# Patient Record
Sex: FEMALE | Race: WHITE | NOT HISPANIC OR LATINO | URBAN - METROPOLITAN AREA
[De-identification: names, ages, dates, MRNs, and addresses within clinical notes are randomized per-mention and may not be internally consistent; named-entity substitution may affect disease eponyms.]

---

## 2022-06-04 ENCOUNTER — TELEPHONE ENCOUNTER (OUTPATIENT)
Dept: URBAN - METROPOLITAN AREA CLINIC 68 | Facility: CLINIC | Age: 82
End: 2022-06-04

## 2022-06-04 RX ORDER — LATANOPROST/PF 0.005 %
LATANOPROST( 0.005% OPHTHALMIC  DAILY ) INACTIVE -HX ENTRY DROPS OPHTHALMIC (EYE) DAILY
OUTPATIENT
Start: 2019-01-28

## 2022-06-04 RX ORDER — MONTELUKAST SODIUM 10 MG/1
SINGULAIR(    1 TABLET EVERY EVENING ) INACTIVE -HX ENTRY TABLET, FILM COATED ORAL
OUTPATIENT
Start: 2008-11-05

## 2022-06-04 RX ORDER — CLOPIDOGREL BISULFATE 75 MG
PLAVIX(    1 TABLET DAILY ) INACTIVE -HX ENTRY TABLET ORAL
OUTPATIENT
Start: 2008-11-05

## 2022-06-04 RX ORDER — LEVOTHYROXINE SODIUM 50 UG/1
TABLET ORAL AS DIRECTED
OUTPATIENT
Start: 2011-08-24 | End: 2015-07-02

## 2022-06-04 RX ORDER — FAMOTIDINE 10 MG/1
FAMOTIDINE(    1 TABLET DAILY AS NEEDED ) INACTIVE -HX ENTRY TABLET ORAL
OUTPATIENT
Start: 2008-11-05

## 2022-06-04 RX ORDER — DEXLANSOPRAZOLE 60 MG/1
DEXILANT( 60MG ORAL  ONE CAPSULE DAILY ) INACTIVE -HX ENTRY CAPSULE, DELAYED RELEASE ORAL
OUTPATIENT
Start: 2011-08-24

## 2022-06-04 RX ORDER — METOPROLOL SUCCINATE 25 MG/1
TOPROL XL(    1/2 BID ) INACTIVE -HX ENTRY TABLET, EXTENDED RELEASE ORAL
OUTPATIENT
Start: 2010-01-22

## 2022-06-04 RX ORDER — POLYETHYLENE GLYOCOL 3350, SODIUM CHLORIDE, SODIUM BICARBONATE AND POTASSIUM CHLORIDE 420; 11.2; 5.72; 1.48 G/4L; G/4L; G/4L; G/4L
TRILYTE(    1 TIME ONLY ) INACTIVE -HX ENTRY POWDER, FOR SOLUTION NASOGASTRIC; ORAL
OUTPATIENT
Start: 2010-01-22

## 2022-06-04 RX ORDER — LINAGLIPTIN 5 MG/1
TRADJENTA( 5MG ORAL  DAILY ) INACTIVE -HX ENTRY TABLET, FILM COATED ORAL DAILY
OUTPATIENT
Start: 2019-01-28

## 2022-06-04 RX ORDER — ESOMEPRAZOLE MAGNESIUM 40 MG
NEXIUM(    1 TABLET 30 MINUTES BEFORE BREAKFAST AND 30 MINUTES BEFORE EVENING MEAL. ) INACTIVE -HX ENTRY CAPSULE,DELAYED RELEASE (ENTERIC COATED) ORAL
OUTPATIENT
Start: 2006-04-12

## 2022-06-04 RX ORDER — FEBUXOSTAT 40 MG/1
ULORIC( 40MG ORAL  DAILY ) INACTIVE -HX ENTRY TABLET ORAL DAILY
OUTPATIENT
Start: 2019-01-28

## 2022-06-04 RX ORDER — HYDROXYUREA 500 MG/1
HYDROXYUREA(    2 CAPS DAILY ) INACTIVE -HX ENTRY CAPSULE ORAL
OUTPATIENT
Start: 2010-01-22

## 2022-06-04 RX ORDER — POTASSIUM CHLORIDE 600 MG/1
KLOR-CON(    QD ) INACTIVE -HX ENTRY TABLET, FILM COATED, EXTENDED RELEASE ORAL QD
OUTPATIENT
Start: 2005-01-03

## 2022-06-04 RX ORDER — FUROSEMIDE 20 MG/1
LASIX(    1 TABLET DAILY ) INACTIVE -HX ENTRY TABLET ORAL
OUTPATIENT
Start: 2005-01-03

## 2022-06-04 RX ORDER — RABEPRAZOLE SODIUM 20 MG/1
ACIPHEX(    1/2 HOUR BEFORE BREAKFAST ) INACTIVE -HX ENTRY TABLET, DELAYED RELEASE ORAL
OUTPATIENT
Start: 2006-04-06

## 2022-06-04 RX ORDER — ESOMEPRAZOLE MAGNESIUM 40 MG
NEXIUM(    1 TABLET 30 MINUTES BEFORE BREAKFAST AND 30 MINUTES BEFORE EVENING MEAL. ) INACTIVE -HX ENTRY CAPSULE,DELAYED RELEASE (ENTERIC COATED) ORAL
OUTPATIENT
Start: 2006-04-24

## 2022-06-04 RX ORDER — PREDNISONE 5 MG/1
PREDNISONE( 5MG ORAL 7 MG AS DIRECTED ) INACTIVE -HX ENTRY TABLET ORAL AS DIRECTED
OUTPATIENT
Start: 2011-05-25

## 2022-06-04 RX ORDER — MELOXICAM 7.5 MG
MOBIC(    ONE PO BID ) INACTIVE -HX ENTRY TABLET ORAL
OUTPATIENT
Start: 2005-01-03

## 2022-06-04 RX ORDER — POLYETHYLENE GLYCOL 3350, SODIUM SULFATE, SODIUM CHLORIDE, POTASSIUM CHLORIDE, ASCORBIC ACID, SODIUM ASCORBATE 7.5-2.691G
KIT ORAL
Qty: 1 | Refills: 0 | OUTPATIENT
Start: 2014-12-09 | End: 2015-07-02

## 2022-06-04 RX ORDER — METOPROLOL SUCCINATE 25 MG/1
TOPROL XL(    2 TABS PO DAILY ) INACTIVE -HX ENTRY TABLET, EXTENDED RELEASE ORAL
OUTPATIENT
Start: 2010-08-16

## 2022-06-04 RX ORDER — ESOMEPRAZOLE MAGNESIUM 40 MG
NEXIUM(    1 TAB 30 MINUTES BEFORE BREAKFAST DAILY ) INACTIVE -HX ENTRY CAPSULE,DELAYED RELEASE (ENTERIC COATED) ORAL
OUTPATIENT
Start: 2006-04-20

## 2022-06-04 RX ORDER — MAGNESIUM 250 MG
TABLET ORAL AS DIRECTED
OUTPATIENT
Start: 2011-07-19 | End: 2013-06-18

## 2022-06-04 RX ORDER — INSULIN ASPART 100 [IU]/ML
NOVOLOG FLEXPEN( 100UNIT/ML SUBCUTANEOUS 8 UNITS WITH MEALS ) INACTIVE -HX ENTRY INJECTION, SOLUTION INTRAVENOUS; SUBCUTANEOUS
OUTPATIENT
Start: 2019-01-28

## 2022-06-04 RX ORDER — FLUTICASONE PROPIONATE AND SALMETEROL 50; 250 UG/1; UG/1
POWDER RESPIRATORY (INHALATION) TWICE DAILY
OUTPATIENT
Start: 2010-01-22 | End: 2012-09-04

## 2022-06-04 RX ORDER — MILK THISTLE 150 MG
CAPSULE ORAL DAILY
Qty: 0 | Refills: 0 | OUTPATIENT
Start: 2011-12-09 | End: 2012-01-08

## 2022-06-05 ENCOUNTER — TELEPHONE ENCOUNTER (OUTPATIENT)
Dept: URBAN - METROPOLITAN AREA CLINIC 68 | Facility: CLINIC | Age: 82
End: 2022-06-05

## 2022-06-05 RX ORDER — FERROUS SULFATE 325(65) MG
VITAMIN D3( 2000UNIT ORAL  DAILY ) ACTIVE -HX ENTRY TABLET ORAL DAILY
Status: ACTIVE | COMMUNITY
Start: 2019-01-28

## 2022-06-05 RX ORDER — PREDNISONE 1 MG/1
PREDNISONE( 1MG ORAL 9  DAILY ) ACTIVE -HX ENTRY TABLET ORAL DAILY
Status: ACTIVE | COMMUNITY
Start: 2019-01-28

## 2022-06-05 RX ORDER — INSULIN DEGLUDEC INJECTION 100 U/ML
TRESIBA FLEXTOUCH( 100UNIT/ML SUBCUTANEOUS 40 UNITS DAILY ) ACTIVE -HX ENTRY INJECTION, SOLUTION SUBCUTANEOUS DAILY
Status: ACTIVE | COMMUNITY
Start: 2019-01-28

## 2022-06-05 RX ORDER — MAGNESIUM OXIDE 400 MG/1
MAGNESIUM OXIDE( 400MG ORAL  EVERY OTHER DAY ) ACTIVE -HX ENTRY TABLET ORAL EVERY OTHER DAY
Status: ACTIVE | COMMUNITY
Start: 2019-01-28

## 2022-06-05 RX ORDER — COLCHICINE 0.6 MG/1
COLCRYS( 0.6MG ORAL  PRN ) ACTIVE -HX ENTRY TABLET, FILM COATED ORAL PRN
Status: ACTIVE | COMMUNITY
Start: 2019-01-28

## 2022-06-05 RX ORDER — CYCLOSPORINE 0.5 MG/ML
RESTASIS( 0.05% OPHTHALMIC  DAILY ) ACTIVE -HX ENTRY EMULSION OPHTHALMIC DAILY
Status: ACTIVE | COMMUNITY
Start: 2019-01-28

## 2022-06-05 RX ORDER — ALLOPURINOL 300 MG/1
ALLOPURINOL( 300MG ORAL 1 DAILY ) ACTIVE -HX ENTRY TABLET ORAL DAILY
Status: ACTIVE | COMMUNITY
Start: 2019-01-28

## 2022-06-05 RX ORDER — LEVOTHYROXINE SODIUM 88 UG/1
SYNTHROID( 88MCG ORAL  DAILY ) ACTIVE -HX ENTRY TABLET ORAL DAILY
Status: ACTIVE | COMMUNITY
Start: 2015-07-02

## 2022-06-05 RX ORDER — BUMETANIDE 2 MG/1
BUMEX( 2MG ORAL  TWO TIMES DAILY ) ACTIVE -HX ENTRY TABLET ORAL
Status: ACTIVE | COMMUNITY
Start: 2010-08-16

## 2022-06-05 RX ORDER — GINKGO BILOBA LEAF EXTRACT 120 MG
MILK THISTLE( 250MG ORAL  DAILY ) ACTIVE -HX ENTRY CAPSULE ORAL DAILY
Status: ACTIVE | COMMUNITY
Start: 2019-01-28

## 2022-06-05 RX ORDER — ROSUVASTATIN CALCIUM 10 MG
CRESTOR( 10MG ORAL 1 DAILY ) ACTIVE -HX ENTRY TABLET ORAL DAILY
Status: ACTIVE | COMMUNITY
Start: 2019-01-28

## 2022-06-05 RX ORDER — BRIMONIDINE TARTRATE 1 MG/ML
ALPHAGAN P( 0.1% OPHTHALMIC  DAILY ) ACTIVE -HX ENTRY SOLUTION/ DROPS OPHTHALMIC DAILY
Status: ACTIVE | COMMUNITY
Start: 2019-01-28

## 2022-06-05 RX ORDER — TRAMADOL HYDROCHLORIDE 50 MG/1
TRAMADOL HCL( 50MG ORAL  THREE TIMES DAILY AS NEEDED ) ACTIVE -HX ENTRY TABLET ORAL
Status: ACTIVE | COMMUNITY
Start: 2019-01-28

## 2022-06-05 RX ORDER — HYDROXYUREA 500 MG/1
CAPSULE ORAL DAILY
Status: ACTIVE | COMMUNITY
Start: 2011-06-29

## 2022-06-05 RX ORDER — EXENATIDE 2 MG/.65ML
BYDUREON( 2MG SUBCUTANEOUS 1 PER WEEK ) ACTIVE -HX ENTRY INJECTION, SUSPENSION, EXTENDED RELEASE SUBCUTANEOUS
Status: ACTIVE | COMMUNITY
Start: 2019-01-28

## 2022-06-05 RX ORDER — LEVOTHYROXINE SODIUM 88 UG/1
SYNTHROID( 88MCG ORAL 1 DAILY ) ACTIVE -HX ENTRY TABLET ORAL DAILY
Status: ACTIVE | COMMUNITY
Start: 2019-01-28

## 2022-06-05 RX ORDER — ZOLEDRONIC ACID 5 MG/100ML
RECLAST( 5MG/100ML INTRAVENOUS  ANUALLY ) ACTIVE -HX ENTRY INJECTION, SOLUTION INTRAVENOUS
Status: ACTIVE | COMMUNITY
Start: 2019-01-28

## 2022-06-05 RX ORDER — NITROGLYCERIN 0.3 MG/1
NITROSTAT( 0.3MG SUBLINGUAL  AS NEEDED ) ACTIVE -HX ENTRY TABLET SUBLINGUAL AS NEEDED
Status: ACTIVE | COMMUNITY
Start: 2019-01-28

## 2022-06-05 RX ORDER — OMEPRAZOLE 20 MG/1
CAPSULE, DELAYED RELEASE ORAL
Qty: 180 | Refills: 180 | Status: ACTIVE | COMMUNITY
Start: 2020-12-28

## 2022-06-25 ENCOUNTER — TELEPHONE ENCOUNTER (OUTPATIENT)
Age: 82
End: 2022-06-25

## 2022-06-25 RX ORDER — MAGNESIUM 250 MG
TABLET ORAL AS DIRECTED
OUTPATIENT
Start: 2011-07-19 | End: 2013-06-18

## 2022-06-25 RX ORDER — SITAGLIPTIN PHOSPHATE 100 MG
JANUVIA(    QD ) INACTIVE -HX ENTRY TABLET ORAL QD
OUTPATIENT
Start: 2010-01-22

## 2022-06-25 RX ORDER — CALCIUM CARBONATE/VITAMIN D3 600 MG-10
CALCIUM + D(    BID ) INACTIVE -HX ENTRY TABLET ORAL BID
OUTPATIENT
Start: 2011-05-25

## 2022-06-25 RX ORDER — BIMATOPROST 0.1 MG/ML
SOLUTION/ DROPS OPHTHALMIC
OUTPATIENT
Start: 2011-05-25 | End: 2012-09-04

## 2022-06-25 RX ORDER — ASPIRIN 81 MG/1
ASPIRIN(    1 TABLET DAILY ) INACTIVE -HX ENTRY TABLET, COATED ORAL
OUTPATIENT
Start: 2008-11-05

## 2022-06-25 RX ORDER — MONTELUKAST SODIUM 10 MG/1
SINGULAIR(    1 TABLET EVERY EVENING ) INACTIVE -HX ENTRY TABLET, FILM COATED ORAL
OUTPATIENT
Start: 2008-11-05

## 2022-06-25 RX ORDER — DEXTROAMPHETAMINE SULFATE 10 MG/1
DARVOCET-N 100(    PRN ) INACTIVE -HX ENTRY CAPSULE, EXTENDED RELEASE ORAL PRN
OUTPATIENT
Start: 2010-01-22

## 2022-06-25 RX ORDER — LATANOPROST/PF 0.005 %
LATANOPROST( 0.005% OPHTHALMIC  DAILY ) INACTIVE -HX ENTRY DROPS OPHTHALMIC (EYE) DAILY
OUTPATIENT
Start: 2019-01-28

## 2022-06-25 RX ORDER — HYDROXYUREA 500 MG/1
HYDROXYUREA(    2 CAPS DAILY ) INACTIVE -HX ENTRY CAPSULE ORAL
OUTPATIENT
Start: 2010-01-22

## 2022-06-25 RX ORDER — FEBUXOSTAT 40 MG/1
ULORIC( 40MG ORAL  DAILY ) INACTIVE -HX ENTRY TABLET ORAL DAILY
OUTPATIENT
Start: 2019-01-28

## 2022-06-25 RX ORDER — INSULIN ASPART 100 [IU]/ML
NOVOLOG FLEXPEN( 100UNIT/ML SUBCUTANEOUS 8 UNITS WITH MEALS ) INACTIVE -HX ENTRY INJECTION, SOLUTION INTRAVENOUS; SUBCUTANEOUS
OUTPATIENT
Start: 2019-01-28

## 2022-06-25 RX ORDER — INSULIN GLARGINE 100 [IU]/ML
LANTUS FOR OPTICLIK( 100UNIT/ML SUBCUTANEOUS 71 UNITS AS DIRECTED ) INACTIVE -HX ENTRY INJECTION, SOLUTION SUBCUTANEOUS AS DIRECTED
OUTPATIENT
Start: 2013-06-18

## 2022-06-25 RX ORDER — DEXLANSOPRAZOLE 30 MG
KAPIDEX(    1QD ) INACTIVE -HX ENTRY CAPSULE, DELAYED RELEASE, BIPHASIC ORAL
OUTPATIENT
Start: 2010-01-22

## 2022-06-25 RX ORDER — PREDNISONE 5 MG/1
PREDNISONE( 5MG ORAL 7 MG AS DIRECTED ) INACTIVE -HX ENTRY TABLET ORAL AS DIRECTED
OUTPATIENT
Start: 2011-05-25

## 2022-06-25 RX ORDER — PREDNISONE 10 MG/1
PREDNISONE(    6 MG ) INACTIVE -HX ENTRY TABLET ORAL
OUTPATIENT
Start: 2010-01-22

## 2022-06-25 RX ORDER — LINAGLIPTIN 5 MG/1
TRADJENTA( 5MG ORAL  DAILY ) INACTIVE -HX ENTRY TABLET, FILM COATED ORAL DAILY
OUTPATIENT
Start: 2019-01-28

## 2022-06-25 RX ORDER — POTASSIUM CHLORIDE 600 MG/1
KLOR-CON(    QD ) INACTIVE -HX ENTRY TABLET, FILM COATED, EXTENDED RELEASE ORAL QD
OUTPATIENT
Start: 2005-01-03

## 2022-06-25 RX ORDER — PREDNISONE 10 MG/1
PREDNISONE(    1 TABLET DAILY ) INACTIVE -HX ENTRY TABLET ORAL
OUTPATIENT
Start: 2010-08-16

## 2022-06-25 RX ORDER — FUROSEMIDE 20 MG/1
LASIX(    1 TABLET DAILY ) INACTIVE -HX ENTRY TABLET ORAL
OUTPATIENT
Start: 2005-01-03

## 2022-06-25 RX ORDER — INSULIN GLARGINE 100 [IU]/ML
LANTUS FOR OPTICLIK( 100UNIT/ML SUBCUTANEOUS 68 UNITS DAILY ) INACTIVE -HX ENTRY INJECTION, SOLUTION SUBCUTANEOUS DAILY
OUTPATIENT
Start: 2019-01-28

## 2022-06-25 RX ORDER — MILK THISTLE 150 MG
CAPSULE ORAL DAILY
Qty: 0 | Refills: 0 | OUTPATIENT
Start: 2011-12-09 | End: 2012-01-08

## 2022-06-25 RX ORDER — ALBUTEROL SULFATE 108 UG/1
PROVENTIL(     ) INACTIVE -HX ENTRY AEROSOL, METERED RESPIRATORY (INHALATION)
OUTPATIENT
Start: 2008-11-05

## 2022-06-25 RX ORDER — DEXLANSOPRAZOLE 60 MG/1
DEXILANT( 60MG ORAL  ONE CAPSULE DAILY ) INACTIVE -HX ENTRY CAPSULE, DELAYED RELEASE ORAL
OUTPATIENT
Start: 2011-08-24

## 2022-06-25 RX ORDER — POLYETHYLENE GLYCOL 3350, SODIUM SULFATE, SODIUM CHLORIDE, POTASSIUM CHLORIDE, ASCORBIC ACID, SODIUM ASCORBATE 7.5-2.691G
KIT ORAL
Qty: 1 | Refills: 0 | OUTPATIENT
Start: 2014-12-09 | End: 2015-07-02

## 2022-06-25 RX ORDER — METOPROLOL SUCCINATE 25 MG/1
TOPROL XL(    1/2 BID ) INACTIVE -HX ENTRY TABLET, EXTENDED RELEASE ORAL
OUTPATIENT
Start: 2010-01-22

## 2022-06-25 RX ORDER — LIRAGLUTIDE 6 MG/ML
VICTOZA( 18MG/3ML SUBCUTANEOUS  AS DIRECTED ) INACTIVE -HX ENTRY INJECTION SUBCUTANEOUS AS DIRECTED
OUTPATIENT
Start: 2012-09-04

## 2022-06-25 RX ORDER — RABEPRAZOLE SODIUM 20 MG/1
ACIPHEX(    1/2 HOUR BEFORE BREAKFAST ) INACTIVE -HX ENTRY TABLET, DELAYED RELEASE ORAL
OUTPATIENT
Start: 2006-04-06

## 2022-06-25 RX ORDER — FAMOTIDINE 10 MG/1
FAMOTIDINE(    1 TABLET DAILY AS NEEDED ) INACTIVE -HX ENTRY TABLET, FILM COATED ORAL
OUTPATIENT
Start: 2008-11-05

## 2022-06-25 RX ORDER — ERGOCALCIFEROL 1.25 MG/1
CAPSULE ORAL
OUTPATIENT
Start: 2011-05-25 | End: 2019-01-28

## 2022-06-25 RX ORDER — CLOPIDOGREL BISULFATE 75 MG
PLAVIX(    1 TABLET DAILY ) INACTIVE -HX ENTRY TABLET ORAL
OUTPATIENT
Start: 2008-11-05

## 2022-06-25 RX ORDER — ESOMEPRAZOLE MAGNESIUM 40 MG
NEXIUM(    1 TAB 30 MINUTES BEFORE BREAKFAST DAILY ) INACTIVE -HX ENTRY CAPSULE,DELAYED RELEASE (ENTERIC COATED) ORAL
OUTPATIENT
Start: 2006-04-20

## 2022-06-25 RX ORDER — LEVOTHYROXINE SODIUM 50 UG/1
TABLET ORAL AS DIRECTED
OUTPATIENT
Start: 2011-08-24 | End: 2015-07-02

## 2022-06-25 RX ORDER — DILTIAZEM HCL 120 MG
CARDIZEM(    QD ) INACTIVE -HX ENTRY CAPSULE, EXT RELEASE 24 HR ORAL QD
OUTPATIENT
Start: 2008-11-05

## 2022-06-25 RX ORDER — ESOMEPRAZOLE MAGNESIUM 40 MG
NEXIUM(    1 TABLET 30 MINUTES BEFORE BREAKFAST AND 30 MINUTES BEFORE EVENING MEAL. ) INACTIVE -HX ENTRY CAPSULE,DELAYED RELEASE (ENTERIC COATED) ORAL
OUTPATIENT
Start: 2006-04-24

## 2022-06-25 RX ORDER — METOPROLOL SUCCINATE 25 MG/1
TOPROL XL(    2 TABS PO DAILY ) INACTIVE -HX ENTRY TABLET, EXTENDED RELEASE ORAL
OUTPATIENT
Start: 2010-08-16

## 2022-06-25 RX ORDER — ESOMEPRAZOLE MAGNESIUM 40 MG
NEXIUM(    1 TABLET 30 MINUTES BEFORE BREAKFAST AND 30 MINUTES BEFORE EVENING MEAL. ) INACTIVE -HX ENTRY CAPSULE,DELAYED RELEASE (ENTERIC COATED) ORAL
OUTPATIENT
Start: 2006-04-12

## 2022-06-26 ENCOUNTER — TELEPHONE ENCOUNTER (OUTPATIENT)
Age: 82
End: 2022-06-26

## 2022-06-26 RX ORDER — NITROGLYCERIN 0.3 MG/1
NITROSTAT( 0.3MG SUBLINGUAL  AS NEEDED ) ACTIVE -HX ENTRY TABLET SUBLINGUAL AS NEEDED
Status: ACTIVE | COMMUNITY
Start: 2019-01-28

## 2022-06-26 RX ORDER — ZOLEDRONIC ACID 5 MG/100ML
RECLAST( 5MG/100ML INTRAVENOUS  ANUALLY ) ACTIVE -HX ENTRY INJECTION, SOLUTION INTRAVENOUS
Status: ACTIVE | COMMUNITY
Start: 2019-01-28

## 2022-06-26 RX ORDER — COLCHICINE 0.6 MG/1
COLCRYS( 0.6MG ORAL  PRN ) ACTIVE -HX ENTRY TABLET, FILM COATED ORAL PRN
Status: ACTIVE | COMMUNITY
Start: 2019-01-28

## 2022-06-26 RX ORDER — BRIMONIDINE TARTRATE 1 MG/ML
ALPHAGAN P( 0.1% OPHTHALMIC  DAILY ) ACTIVE -HX ENTRY SOLUTION/ DROPS OPHTHALMIC DAILY
Status: ACTIVE | COMMUNITY
Start: 2019-01-28

## 2022-06-26 RX ORDER — HYDROXYUREA 500 MG/1
CAPSULE ORAL DAILY
Status: ACTIVE | COMMUNITY
Start: 2011-06-29

## 2022-06-26 RX ORDER — OMEGA-3-ACID ETHYL ESTERS 1 G/1
LOVAZA(    ONE TAB DAILY ) ACTIVE -HX ENTRY CAPSULE, LIQUID FILLED ORAL
Status: ACTIVE | COMMUNITY
Start: 2011-05-25

## 2022-06-26 RX ORDER — GLUCOSAMINE/MSM/CHONDROIT SULF 500-166.6
VITAMIN D3( 2000UNIT ORAL  DAILY ) ACTIVE -HX ENTRY TABLET ORAL DAILY
Status: ACTIVE | COMMUNITY
Start: 2019-01-28

## 2022-06-26 RX ORDER — MAGNESIUM OXIDE 400 MG/1
MAGNESIUM OXIDE( 400MG ORAL  EVERY OTHER DAY ) ACTIVE -HX ENTRY TABLET ORAL EVERY OTHER DAY
Status: ACTIVE | COMMUNITY
Start: 2019-01-28

## 2022-06-26 RX ORDER — LEVOTHYROXINE SODIUM 88 MCG
SYNTHROID( 88MCG ORAL 1 DAILY ) ACTIVE -HX ENTRY TABLET ORAL DAILY
Status: ACTIVE | COMMUNITY
Start: 2019-01-28

## 2022-06-26 RX ORDER — GINKGO BILOBA LEAF EXTRACT 120 MG
MILK THISTLE( 250MG ORAL  DAILY ) ACTIVE -HX ENTRY CAPSULE ORAL DAILY
Status: ACTIVE | COMMUNITY
Start: 2019-01-28

## 2022-06-26 RX ORDER — PREDNISONE 1 MG/1
PREDNISONE( 1MG ORAL 9  DAILY ) ACTIVE -HX ENTRY TABLET ORAL DAILY
Status: ACTIVE | COMMUNITY
Start: 2019-01-28

## 2022-06-26 RX ORDER — LEVOTHYROXINE SODIUM 88 MCG
SYNTHROID( 88MCG ORAL  DAILY ) ACTIVE -HX ENTRY TABLET ORAL DAILY
Status: ACTIVE | COMMUNITY
Start: 2015-07-02

## 2022-06-26 RX ORDER — INSULIN DEGLUDEC INJECTION 100 U/ML
TRESIBA FLEXTOUCH( 100UNIT/ML SUBCUTANEOUS 40 UNITS DAILY ) ACTIVE -HX ENTRY INJECTION, SOLUTION SUBCUTANEOUS DAILY
Status: ACTIVE | COMMUNITY
Start: 2019-01-28

## 2022-06-26 RX ORDER — ROSUVASTATIN CALCIUM 10 MG
CRESTOR( 10MG ORAL 1 DAILY ) ACTIVE -HX ENTRY TABLET ORAL DAILY
Status: ACTIVE | COMMUNITY
Start: 2019-01-28

## 2022-06-26 RX ORDER — OMEPRAZOLE 20 MG/1
CAPSULE, DELAYED RELEASE ORAL
Qty: 180 | Refills: 180 | Status: ACTIVE | COMMUNITY
Start: 2020-12-28

## 2022-06-26 RX ORDER — ASPIRIN 81 MG/1
LOW-DOSE ASPIRIN( 81MG ORAL  DAILY ) ACTIVE -HX ENTRY TABLET, COATED ORAL DAILY
Status: ACTIVE | COMMUNITY
Start: 2019-01-28

## 2022-06-26 RX ORDER — CYCLOSPORINE 0.5 MG/ML
RESTASIS( 0.05% OPHTHALMIC  DAILY ) ACTIVE -HX ENTRY EMULSION OPHTHALMIC DAILY
Status: ACTIVE | COMMUNITY
Start: 2019-01-28

## 2022-06-26 RX ORDER — DIGOXIN 100 %
DIGOXIN(    1 TABLET DAILY ) ACTIVE -HX ENTRY POWDER (GRAM) MISCELLANEOUS
Status: ACTIVE | COMMUNITY
Start: 2011-05-25

## 2022-06-26 RX ORDER — ALLOPURINOL 300 MG/1
ALLOPURINOL( 300MG ORAL 1 DAILY ) ACTIVE -HX ENTRY TABLET ORAL DAILY
Status: ACTIVE | COMMUNITY
Start: 2019-01-28

## 2022-06-26 RX ORDER — TRAMADOL HYDROCHLORIDE 50 MG/1
TRAMADOL HCL( 50MG ORAL  THREE TIMES DAILY AS NEEDED ) ACTIVE -HX ENTRY TABLET ORAL
Status: ACTIVE | COMMUNITY
Start: 2019-01-28

## 2022-08-04 ENCOUNTER — APPOINTMENT (RX ONLY)
Dept: URBAN - METROPOLITAN AREA CLINIC 126 | Facility: CLINIC | Age: 82
Setting detail: DERMATOLOGY
End: 2022-08-04

## 2022-08-04 DIAGNOSIS — Z71.89 OTHER SPECIFIED COUNSELING: ICD-10-CM

## 2022-08-04 DIAGNOSIS — D17 BENIGN LIPOMATOUS NEOPLASM: ICD-10-CM

## 2022-08-04 DIAGNOSIS — D69.2 OTHER NONTHROMBOCYTOPENIC PURPURA: ICD-10-CM

## 2022-08-04 DIAGNOSIS — D49.2 NEOPLASM OF UNSPECIFIED BEHAVIOR OF BONE, SOFT TISSUE, AND SKIN: ICD-10-CM

## 2022-08-04 PROBLEM — D17.21 BENIGN LIPOMATOUS NEOPLASM OF SKIN AND SUBCUTANEOUS TISSUE OF RIGHT ARM: Status: ACTIVE | Noted: 2022-08-04

## 2022-08-04 PROCEDURE — ? BIOPSY BY SHAVE METHOD

## 2022-08-04 PROCEDURE — 99203 OFFICE O/P NEW LOW 30 MIN: CPT | Mod: 25

## 2022-08-04 PROCEDURE — ? SUNSCREEN RECOMMENDATIONS

## 2022-08-04 PROCEDURE — ? COUNSELING

## 2022-08-04 PROCEDURE — 56605 BIOPSY OF VULVA/PERINEUM: CPT

## 2022-08-04 ASSESSMENT — LOCATION ZONE DERM
LOCATION ZONE: ARM
LOCATION ZONE: VULVA

## 2022-08-04 ASSESSMENT — LOCATION DETAILED DESCRIPTION DERM
LOCATION DETAILED: MONS PUBIS
LOCATION DETAILED: RIGHT PROXIMAL DORSAL FOREARM
LOCATION DETAILED: LEFT DISTAL DORSAL FOREARM
LOCATION DETAILED: RIGHT PROXIMAL RADIAL DORSAL FOREARM

## 2022-08-04 ASSESSMENT — LOCATION SIMPLE DESCRIPTION DERM
LOCATION SIMPLE: LEFT FOREARM
LOCATION SIMPLE: GROIN
LOCATION SIMPLE: RIGHT FOREARM

## 2022-08-04 NOTE — PROCEDURE: BIOPSY BY SHAVE METHOD
Body Location Override (Optional - Billing Will Still Be Based On Selected Body Map Location If Applicable): right mons pubis
Detail Level: Detailed
Depth Of Biopsy: dermis
Was A Bandage Applied: Yes
Size Of Lesion In Cm: 0
Biopsy Type: H and E
Biopsy Method: Personna blade
Anesthesia Type: 1% lidocaine without epinephrine and a 1:3 solution of 8.4% sodium bicarbonate
Anesthesia Volume In Cc (Will Not Render If 0): 0.5
Hemostasis: Electrocautery and Aluminum Chloride
Wound Care: Petrolatum
Dressing: bandage
Destruction After The Procedure: No
Type Of Destruction Used: Curettage
Curettage Text: The wound bed was treated with curettage after the biopsy was performed.
Cryotherapy Text: The wound bed was treated with cryotherapy after the biopsy was performed.
Electrodesiccation Text: The wound bed was treated with electrodesiccation after the biopsy was performed.
Electrodesiccation And Curettage Text: The wound bed was treated with electrodesiccation and curettage after the biopsy was performed.
Silver Nitrate Text: The wound bed was treated with silver nitrate after the biopsy was performed.
Lab: [facility] [place]
Lab Facility: [id number]
Consent: Written consent was obtained and risks were reviewed including but not limited to scarring, infection, bleeding, scabbing, incomplete removal, nerve damage and allergy to anesthesia.
Post-Care Instructions: I reviewed with the patient in detail post-care instructions. Patient is to keep the biopsy site dry overnight, and then apply bacitracin twice daily until healed. Patient may apply hydrogen peroxide soaks to remove any crusting.
Notification Instructions: Patient will be notified of biopsy results. However, patient instructed to call the office if not contacted within 2 weeks.
Billing Type: [employer]
Information: Selecting Yes will display possible errors in your note based on the variables you have selected. This validation is only offered as a suggestion for you. PLEASE NOTE THAT THE VALIDATION TEXT WILL BE REMOVED WHEN YOU FINALIZE YOUR NOTE. IF YOU WANT TO FAX A PRELIMINARY NOTE YOU WILL NEED TO TOGGLE THIS TO 'NO' IF YOU DO NOT WANT IT IN YOUR FAXED NOTE.

## 2023-08-04 ENCOUNTER — APPOINTMENT (RX ONLY)
Dept: URBAN - METROPOLITAN AREA CLINIC 126 | Facility: CLINIC | Age: 83
Setting detail: DERMATOLOGY
End: 2023-08-04

## 2023-08-04 DIAGNOSIS — I83.9 ASYMPTOMATIC VARICOSE VEINS OF LOWER EXTREMITIES: ICD-10-CM

## 2023-08-04 DIAGNOSIS — L90.5 SCAR CONDITIONS AND FIBROSIS OF SKIN: ICD-10-CM

## 2023-08-04 DIAGNOSIS — L84 CORNS AND CALLOSITIES: ICD-10-CM

## 2023-08-04 DIAGNOSIS — L81.4 OTHER MELANIN HYPERPIGMENTATION: ICD-10-CM

## 2023-08-04 DIAGNOSIS — D18.0 HEMANGIOMA: ICD-10-CM

## 2023-08-04 DIAGNOSIS — D49.2 NEOPLASM OF UNSPECIFIED BEHAVIOR OF BONE, SOFT TISSUE, AND SKIN: ICD-10-CM

## 2023-08-04 DIAGNOSIS — Z71.89 OTHER SPECIFIED COUNSELING: ICD-10-CM

## 2023-08-04 DIAGNOSIS — L82.1 OTHER SEBORRHEIC KERATOSIS: ICD-10-CM

## 2023-08-04 PROBLEM — D18.01 HEMANGIOMA OF SKIN AND SUBCUTANEOUS TISSUE: Status: ACTIVE | Noted: 2023-08-04

## 2023-08-04 PROBLEM — I83.93 ASYMPTOMATIC VARICOSE VEINS OF BILATERAL LOWER EXTREMITIES: Status: ACTIVE | Noted: 2023-08-04

## 2023-08-04 PROCEDURE — 11102 TANGNTL BX SKIN SINGLE LES: CPT

## 2023-08-04 PROCEDURE — 99213 OFFICE O/P EST LOW 20 MIN: CPT | Mod: 25

## 2023-08-04 PROCEDURE — ? SUNSCREEN RECOMMENDATIONS

## 2023-08-04 PROCEDURE — ? BIOPSY BY SHAVE METHOD

## 2023-08-04 PROCEDURE — ? COUNSELING

## 2023-08-04 PROCEDURE — ? DIAGNOSIS COMMENT

## 2023-08-04 ASSESSMENT — LOCATION DETAILED DESCRIPTION DERM
LOCATION DETAILED: LEFT DISTAL PRETIBIAL REGION
LOCATION DETAILED: LEFT ANTERIOR SHOULDER
LOCATION DETAILED: MIDDLE STERNUM
LOCATION DETAILED: RIGHT PROXIMAL DORSAL FOREARM
LOCATION DETAILED: LEFT PROXIMAL DORSAL FOREARM
LOCATION DETAILED: LEFT RADIAL DORSAL HAND
LOCATION DETAILED: LEFT PLANTAR FOREFOOT OVERLYING 2ND METATARSAL
LOCATION DETAILED: RIGHT PROXIMAL CALF
LOCATION DETAILED: RIGHT ANKLE

## 2023-08-04 ASSESSMENT — LOCATION ZONE DERM
LOCATION ZONE: HAND
LOCATION ZONE: FEET
LOCATION ZONE: LEG
LOCATION ZONE: ARM
LOCATION ZONE: TRUNK

## 2023-08-04 ASSESSMENT — LOCATION SIMPLE DESCRIPTION DERM
LOCATION SIMPLE: RIGHT FOREARM
LOCATION SIMPLE: LEFT HAND
LOCATION SIMPLE: LEFT FOREARM
LOCATION SIMPLE: RIGHT CALF
LOCATION SIMPLE: CHEST
LOCATION SIMPLE: LEFT PLANTAR SURFACE
LOCATION SIMPLE: LEFT PRETIBIAL REGION
LOCATION SIMPLE: RIGHT ANKLE
LOCATION SIMPLE: LEFT SHOULDER

## 2023-08-04 NOTE — PROCEDURE: DIAGNOSIS COMMENT
Detail Level: Simple
Render Risk Assessment In Note?: no
Comment: Hx BCC( right posterior calf) 10+ years ago

## 2023-08-04 NOTE — PROCEDURE: BIOPSY BY SHAVE METHOD
Body Location Override (Optional - Billing Will Still Be Based On Selected Body Map Location If Applicable): left superior shoulder
Detail Level: Detailed
Depth Of Biopsy: dermis
Was A Bandage Applied: Yes
Size Of Lesion In Cm: 0
Biopsy Type: H and E
Biopsy Method: Personna blade
Anesthesia Type: 1% lidocaine without epinephrine and a 1:3 solution of 8.4% sodium bicarbonate
Anesthesia Volume In Cc (Will Not Render If 0): 0.5
Hemostasis: Electrocautery and Aluminum Chloride
Wound Care: Petrolatum
Dressing: bandage
Destruction After The Procedure: No
Type Of Destruction Used: Curettage
Curettage Text: The wound bed was treated with curettage after the biopsy was performed.
Cryotherapy Text: The wound bed was treated with cryotherapy after the biopsy was performed.
Electrodesiccation Text: The wound bed was treated with electrodesiccation after the biopsy was performed.
Electrodesiccation And Curettage Text: The wound bed was treated with electrodesiccation and curettage after the biopsy was performed.
Silver Nitrate Text: The wound bed was treated with silver nitrate after the biopsy was performed.
Lab: [id number]
Lab Facility: [id number]
Consent: Written consent was obtained and risks were reviewed including but not limited to scarring, infection, bleeding, scabbing, incomplete removal, nerve damage and allergy to anesthesia.
Post-Care Instructions: I reviewed with the patient in detail post-care instructions. Patient is to keep the biopsy site dry overnight, and then apply bacitracin twice daily until healed. Patient may apply hydrogen peroxide soaks to remove any crusting.
Notification Instructions: Patient will be notified of biopsy results. However, patient instructed to call the office if not contacted within 2 weeks.
Billing Type: [employer]
Information: Selecting Yes will display possible errors in your note based on the variables you have selected. This validation is only offered as a suggestion for you. PLEASE NOTE THAT THE VALIDATION TEXT WILL BE REMOVED WHEN YOU FINALIZE YOUR NOTE. IF YOU WANT TO FAX A PRELIMINARY NOTE YOU WILL NEED TO TOGGLE THIS TO 'NO' IF YOU DO NOT WANT IT IN YOUR FAXED NOTE.

## 2024-01-31 ENCOUNTER — OFFICE VISIT (OUTPATIENT)
Dept: URBAN - METROPOLITAN AREA CLINIC 68 | Facility: CLINIC | Age: 84
End: 2024-01-31
Payer: MEDICARE

## 2024-01-31 ENCOUNTER — LAB OUTSIDE AN ENCOUNTER (OUTPATIENT)
Dept: URBAN - METROPOLITAN AREA CLINIC 68 | Facility: CLINIC | Age: 84
End: 2024-01-31

## 2024-01-31 VITALS
BODY MASS INDEX: 34.27 KG/M2 | WEIGHT: 170 LBS | OXYGEN SATURATION: 94 % | HEART RATE: 71 BPM | DIASTOLIC BLOOD PRESSURE: 76 MMHG | HEIGHT: 59 IN | SYSTOLIC BLOOD PRESSURE: 128 MMHG

## 2024-01-31 DIAGNOSIS — R79.89 ELEVATED LFTS: ICD-10-CM

## 2024-01-31 PROCEDURE — 99204 OFFICE O/P NEW MOD 45 MIN: CPT | Performed by: SPECIALIST

## 2024-01-31 RX ORDER — ROSUVASTATIN CALCIUM 10 MG
CRESTOR( 10MG ORAL 1 DAILY ) ACTIVE -HX ENTRY TABLET ORAL DAILY
Status: DISCONTINUED | COMMUNITY
Start: 2019-01-28

## 2024-01-31 RX ORDER — MELATONIN 5 MG
1 TABLET IN THE EVENING TABLET ORAL ONCE A DAY
Status: ACTIVE | COMMUNITY

## 2024-01-31 RX ORDER — ROSUVASTATIN CALCIUM 5 MG/1
1 TABLET TABLET, COATED ORAL ONCE A DAY
Status: ACTIVE | COMMUNITY

## 2024-01-31 RX ORDER — OMEPRAZOLE 20 MG/1
CAPSULE, DELAYED RELEASE ORAL
Qty: 180 | Refills: 180 | Status: DISCONTINUED | COMMUNITY
Start: 2020-12-28

## 2024-01-31 RX ORDER — HYDROXYUREA 500 MG/1
CAPSULE ORAL DAILY
Status: ACTIVE | COMMUNITY
Start: 2011-06-29

## 2024-01-31 RX ORDER — TRAMADOL HYDROCHLORIDE 50 MG/1
TRAMADOL HCL( 50MG ORAL  THREE TIMES DAILY AS NEEDED ) ACTIVE -HX ENTRY TABLET ORAL
Status: ACTIVE | COMMUNITY
Start: 2019-01-28

## 2024-01-31 RX ORDER — GINKGO BILOBA LEAF EXTRACT 120 MG
MILK THISTLE( 250MG ORAL  DAILY ) ACTIVE -HX ENTRY CAPSULE ORAL DAILY
Status: ACTIVE | COMMUNITY
Start: 2019-01-28

## 2024-01-31 RX ORDER — ASPIRIN 81 MG/1
LOW-DOSE ASPIRIN( 81MG ORAL  DAILY ) ACTIVE -HX ENTRY TABLET, COATED ORAL DAILY
Status: ACTIVE | COMMUNITY
Start: 2019-01-28

## 2024-01-31 RX ORDER — FARICIMAB 6 MG/.05ML
AS DIRECTED INJECTION, SOLUTION INTRAVITREAL
Status: ACTIVE | COMMUNITY

## 2024-01-31 RX ORDER — INSULIN DEGLUDEC INJECTION 100 U/ML
TRESIBA FLEXTOUCH( 100UNIT/ML SUBCUTANEOUS 40 UNITS DAILY ) ACTIVE -HX ENTRY INJECTION, SOLUTION SUBCUTANEOUS DAILY
Status: DISCONTINUED | COMMUNITY
Start: 2019-01-28

## 2024-01-31 RX ORDER — GABAPENTIN 300 MG/1
1 CAPSULE CAPSULE ORAL ONCE A DAY
Status: ACTIVE | COMMUNITY

## 2024-01-31 RX ORDER — OMEGA-3-ACID ETHYL ESTERS 1 G/1
LOVAZA(    ONE TAB DAILY ) ACTIVE -HX ENTRY CAPSULE, LIQUID FILLED ORAL
Status: ACTIVE | COMMUNITY
Start: 2011-05-25

## 2024-01-31 RX ORDER — COLCHICINE 0.6 MG/1
COLCRYS( 0.6MG ORAL  PRN ) ACTIVE -HX ENTRY TABLET, FILM COATED ORAL PRN
Status: ACTIVE | COMMUNITY
Start: 2019-01-28

## 2024-01-31 RX ORDER — ZOLEDRONIC ACID 5 MG/100ML
RECLAST( 5MG/100ML INTRAVENOUS  ANUALLY ) ACTIVE -HX ENTRY INJECTION, SOLUTION INTRAVENOUS
Status: DISCONTINUED | COMMUNITY
Start: 2019-01-28

## 2024-01-31 RX ORDER — MAGNESIUM OXIDE 400 MG/1
MAGNESIUM OXIDE( 800 MG ORAL EVERY OTHER DAY ) ACTIVE -HX ENTRY TABLET ORAL EVERY OTHER DAY
Status: ACTIVE | COMMUNITY
Start: 2019-01-28

## 2024-01-31 RX ORDER — CYCLOSPORINE 0.5 MG/ML
RESTASIS( 0.05% OPHTHALMIC  DAILY ) ACTIVE -HX ENTRY EMULSION OPHTHALMIC DAILY
Status: ACTIVE | COMMUNITY
Start: 2019-01-28

## 2024-01-31 RX ORDER — PREDNISONE 1 MG/1
PREDNISONE( 6MG ORAL 9 DAILY ) ACTIVE -HX ENTRY TABLET ORAL DAILY
Status: ACTIVE | COMMUNITY
Start: 2019-01-28

## 2024-01-31 RX ORDER — NITROGLYCERIN 0.3 MG/1
NITROSTAT( 0.3MG SUBLINGUAL  AS NEEDED ) ACTIVE -HX ENTRY TABLET SUBLINGUAL AS NEEDED
Status: DISCONTINUED | COMMUNITY
Start: 2019-01-28

## 2024-01-31 RX ORDER — LEVOTHYROXINE SODIUM 88 MCG
SYNTHROID( 88MCG ORAL  DAILY ) ACTIVE -HX ENTRY TABLET ORAL DAILY
Status: ACTIVE | COMMUNITY
Start: 2015-07-02

## 2024-01-31 RX ORDER — ALLOPURINOL 300 MG/1
ALLOPURINOL( 300MG ORAL 1 DAILY ) ACTIVE -HX ENTRY TABLET ORAL DAILY
Status: ACTIVE | COMMUNITY
Start: 2019-01-28

## 2024-01-31 RX ORDER — NEBIVOLOL HYDROCHLORIDE 10 MG/1
BYSTOLIC( ONE TABLET BY MOUTH DAILY ) ACTIVE -HX ENTRY TABLET ORAL
Status: ACTIVE | COMMUNITY
Start: 2011-05-25

## 2024-01-31 RX ORDER — BRIMONIDINE TARTRATE 1 MG/ML
ALPHAGAN P( 0.1% OPHTHALMIC  DAILY ) ACTIVE -HX ENTRY SOLUTION/ DROPS OPHTHALMIC DAILY
Status: ACTIVE | COMMUNITY
Start: 2019-01-28

## 2024-01-31 RX ORDER — GLUCOSAMINE/MSM/CHONDROIT SULF 500-166.6
VITAMIN D3( 2000UNIT ORAL  DAILY ) ACTIVE -HX ENTRY TABLET ORAL DAILY
Status: ACTIVE | COMMUNITY
Start: 2019-01-28

## 2024-01-31 RX ORDER — NITROGLYCERIN 0.3 MG/1
AS DIRECTED TABLET SUBLINGUAL
Status: ACTIVE | COMMUNITY

## 2024-02-20 ENCOUNTER — FIBROSCAN (OUTPATIENT)
Dept: URBAN - METROPOLITAN AREA CLINIC 67 | Facility: CLINIC | Age: 84
End: 2024-02-20

## 2024-02-28 ENCOUNTER — OV EP (OUTPATIENT)
Dept: URBAN - METROPOLITAN AREA CLINIC 68 | Facility: CLINIC | Age: 84
End: 2024-02-28
Payer: MEDICARE

## 2024-02-28 ENCOUNTER — LAB (OUTPATIENT)
Dept: URBAN - METROPOLITAN AREA CLINIC 68 | Facility: CLINIC | Age: 84
End: 2024-02-28

## 2024-02-28 VITALS
BODY MASS INDEX: 34.27 KG/M2 | WEIGHT: 170 LBS | SYSTOLIC BLOOD PRESSURE: 122 MMHG | DIASTOLIC BLOOD PRESSURE: 72 MMHG | HEIGHT: 59 IN

## 2024-02-28 DIAGNOSIS — R79.89 ELEVATED LFTS: ICD-10-CM

## 2024-02-28 DIAGNOSIS — K44.9 HIATAL HERNIA: ICD-10-CM

## 2024-02-28 DIAGNOSIS — R13.10 DYSPHAGIA, UNSPECIFIED: ICD-10-CM

## 2024-02-28 PROCEDURE — 99214 OFFICE O/P EST MOD 30 MIN: CPT | Performed by: SPECIALIST

## 2024-02-28 RX ORDER — PREDNISONE 1 MG/1
PREDNISONE( 6MG ORAL 9 DAILY ) ACTIVE -HX ENTRY TABLET ORAL DAILY
Status: ACTIVE | COMMUNITY
Start: 2019-01-28

## 2024-02-28 RX ORDER — ASPIRIN 81 MG/1
LOW-DOSE ASPIRIN( 81MG ORAL  DAILY ) ACTIVE -HX ENTRY TABLET, COATED ORAL DAILY
Status: ACTIVE | COMMUNITY
Start: 2019-01-28

## 2024-02-28 RX ORDER — FARICIMAB 6 MG/.05ML
AS DIRECTED INJECTION, SOLUTION INTRAVITREAL
Status: ACTIVE | COMMUNITY

## 2024-02-28 RX ORDER — MAGNESIUM OXIDE 400 MG/1
MAGNESIUM OXIDE( 800 MG ORAL EVERY OTHER DAY ) ACTIVE -HX ENTRY TABLET ORAL EVERY OTHER DAY
Status: ACTIVE | COMMUNITY
Start: 2019-01-28

## 2024-02-28 RX ORDER — GABAPENTIN 300 MG/1
1 CAPSULE CAPSULE ORAL ONCE A DAY
Status: ACTIVE | COMMUNITY

## 2024-02-28 RX ORDER — GLUCOSAMINE/MSM/CHONDROIT SULF 500-166.6
VITAMIN D3( 2000UNIT ORAL  DAILY ) ACTIVE -HX ENTRY TABLET ORAL DAILY
Status: ACTIVE | COMMUNITY
Start: 2019-01-28

## 2024-02-28 RX ORDER — ALLOPURINOL 300 MG/1
ALLOPURINOL( 300MG ORAL 1 DAILY ) ACTIVE -HX ENTRY TABLET ORAL DAILY
Status: ACTIVE | COMMUNITY
Start: 2019-01-28

## 2024-02-28 RX ORDER — COLCHICINE 0.6 MG/1
COLCRYS( 0.6MG ORAL  PRN ) ACTIVE -HX ENTRY TABLET, FILM COATED ORAL PRN
Status: ON HOLD | COMMUNITY
Start: 2019-01-28

## 2024-02-28 RX ORDER — BRIMONIDINE TARTRATE 1 MG/ML
ALPHAGAN P( 0.1% OPHTHALMIC  DAILY ) ACTIVE -HX ENTRY SOLUTION/ DROPS OPHTHALMIC DAILY
Status: ACTIVE | COMMUNITY
Start: 2019-01-28

## 2024-02-28 RX ORDER — NEBIVOLOL HYDROCHLORIDE 10 MG/1
BYSTOLIC( ONE TABLET BY MOUTH DAILY ) ACTIVE -HX ENTRY TABLET ORAL
Status: ACTIVE | COMMUNITY
Start: 2011-05-25

## 2024-02-28 RX ORDER — HYDROXYUREA 500 MG/1
CAPSULE ORAL DAILY
Status: ACTIVE | COMMUNITY
Start: 2011-06-29

## 2024-02-28 RX ORDER — OMEGA-3-ACID ETHYL ESTERS 1 G/1
LOVAZA(    ONE TAB DAILY ) ACTIVE -HX ENTRY CAPSULE, LIQUID FILLED ORAL
Status: ACTIVE | COMMUNITY
Start: 2011-05-25

## 2024-02-28 RX ORDER — NITROGLYCERIN 0.3 MG/1
AS DIRECTED TABLET SUBLINGUAL
Status: ACTIVE | COMMUNITY

## 2024-02-28 RX ORDER — LEVOTHYROXINE SODIUM 88 MCG
SYNTHROID( 88MCG ORAL  DAILY ) ACTIVE -HX ENTRY TABLET ORAL DAILY
Status: ACTIVE | COMMUNITY
Start: 2015-07-02

## 2024-02-28 RX ORDER — MELATONIN 5 MG
1 TABLET IN THE EVENING TABLET ORAL ONCE A DAY
Status: ACTIVE | COMMUNITY

## 2024-02-28 RX ORDER — ROSUVASTATIN CALCIUM 5 MG/1
1 TABLET TABLET, COATED ORAL ONCE A DAY
Status: ACTIVE | COMMUNITY

## 2024-02-28 RX ORDER — GINKGO BILOBA LEAF EXTRACT 120 MG
MILK THISTLE( 250MG ORAL  DAILY ) ACTIVE -HX ENTRY CAPSULE ORAL DAILY
Status: ACTIVE | COMMUNITY
Start: 2019-01-28

## 2024-02-28 RX ORDER — CYCLOSPORINE 0.5 MG/ML
RESTASIS( 0.05% OPHTHALMIC  DAILY ) ACTIVE -HX ENTRY EMULSION OPHTHALMIC DAILY
Status: ACTIVE | COMMUNITY
Start: 2019-01-28

## 2024-02-28 RX ORDER — TRAMADOL HYDROCHLORIDE 50 MG/1
TRAMADOL HCL( 50MG ORAL  THREE TIMES DAILY AS NEEDED ) ACTIVE -HX ENTRY TABLET ORAL
Status: ON HOLD | COMMUNITY
Start: 2019-01-28

## 2024-02-28 NOTE — PHYSICAL EXAM GASTROINTESTINAL
Abdomen , soft, nontender, OBESE ABDOMEN   , no guarding or rigidity , no masses palpable , normal bowel sounds , Liver and Spleen , no hepatomegaly present , no hepatosplenomegaly , liver nontender , spleen not palpable

## 2024-02-28 NOTE — HPI-TODAY'S VISIT:
2/28  new dysphagia, felt an apple piece was stuck, occas solids kristine chicken  Had dilation in 2018 51 FR, some improvement    LFTs are  elevated LFTs for 10 ys,   liver biopsy 2011 report was nondiagnostic No alcohol, diagnosis on Biopsy and now is NAFLD  FibroScan 2024  is  level F0   fatty liver is diagnosis Currently off rosuvastatin, next LFTs pending in a few weeks   HISTORY OF CAD, 2 stents , history of MI in the past and history of arrythmia and cardiac collapse in the hospitl pt has no shortness of breath , no chest pain , cardiac  visit one month ago , stable cardiac conditionm  Dr Tim Need rechweck lfts, and egd with dilation

## 2024-03-22 ENCOUNTER — EGD (OUTPATIENT)
Dept: URBAN - METROPOLITAN AREA SURGERY CENTER 12 | Facility: SURGERY CENTER | Age: 84
End: 2024-03-22
Payer: MEDICARE

## 2024-03-22 ENCOUNTER — LAB (OUTPATIENT)
Dept: URBAN - METROPOLITAN AREA CLINIC 4 | Facility: CLINIC | Age: 84
End: 2024-03-22
Payer: MEDICARE

## 2024-03-22 DIAGNOSIS — K21.9 GASTRO-ESOPHAGEAL REFLUX DISEASE WITHOUT ESOPHAGITIS: ICD-10-CM

## 2024-03-22 DIAGNOSIS — K22.89 OTHER SPECIFIED DISEASE OF ESOPHAGUS: ICD-10-CM

## 2024-03-22 DIAGNOSIS — K22.2 ESOPHAGEAL OBSTRUCTION: ICD-10-CM

## 2024-03-22 PROCEDURE — 43248 EGD GUIDE WIRE INSERTION: CPT | Performed by: SPECIALIST

## 2024-03-22 PROCEDURE — 88305 TISSUE EXAM BY PATHOLOGIST: CPT | Performed by: PATHOLOGY

## 2024-03-22 RX ORDER — FARICIMAB 6 MG/.05ML
AS DIRECTED INJECTION, SOLUTION INTRAVITREAL
Status: ACTIVE | COMMUNITY

## 2024-03-22 RX ORDER — COLCHICINE 0.6 MG/1
COLCRYS( 0.6MG ORAL  PRN ) ACTIVE -HX ENTRY TABLET, FILM COATED ORAL PRN
Status: ON HOLD | COMMUNITY
Start: 2019-01-28

## 2024-03-22 RX ORDER — GABAPENTIN 300 MG/1
1 CAPSULE CAPSULE ORAL ONCE A DAY
Status: ACTIVE | COMMUNITY

## 2024-03-22 RX ORDER — TRAMADOL HYDROCHLORIDE 50 MG/1
TRAMADOL HCL( 50MG ORAL  THREE TIMES DAILY AS NEEDED ) ACTIVE -HX ENTRY TABLET ORAL
Status: ON HOLD | COMMUNITY
Start: 2019-01-28

## 2024-03-22 RX ORDER — MAGNESIUM OXIDE 400 MG/1
MAGNESIUM OXIDE( 800 MG ORAL EVERY OTHER DAY ) ACTIVE -HX ENTRY TABLET ORAL EVERY OTHER DAY
Status: ACTIVE | COMMUNITY
Start: 2019-01-28

## 2024-03-22 RX ORDER — MELATONIN 5 MG
1 TABLET IN THE EVENING TABLET ORAL ONCE A DAY
Status: ACTIVE | COMMUNITY

## 2024-03-22 RX ORDER — NITROGLYCERIN 0.3 MG/1
AS DIRECTED TABLET SUBLINGUAL
Status: ACTIVE | COMMUNITY

## 2024-03-22 RX ORDER — HYDROXYUREA 500 MG/1
CAPSULE ORAL DAILY
Status: ACTIVE | COMMUNITY
Start: 2011-06-29

## 2024-03-22 RX ORDER — NEBIVOLOL HYDROCHLORIDE 10 MG/1
BYSTOLIC( ONE TABLET BY MOUTH DAILY ) ACTIVE -HX ENTRY TABLET ORAL
Status: ACTIVE | COMMUNITY
Start: 2011-05-25

## 2024-03-22 RX ORDER — ASPIRIN 81 MG/1
LOW-DOSE ASPIRIN( 81MG ORAL  DAILY ) ACTIVE -HX ENTRY TABLET, COATED ORAL DAILY
Status: ACTIVE | COMMUNITY
Start: 2019-01-28

## 2024-03-22 RX ORDER — BRIMONIDINE TARTRATE 1 MG/ML
ALPHAGAN P( 0.1% OPHTHALMIC  DAILY ) ACTIVE -HX ENTRY SOLUTION/ DROPS OPHTHALMIC DAILY
Status: ACTIVE | COMMUNITY
Start: 2019-01-28

## 2024-03-22 RX ORDER — OMEGA-3-ACID ETHYL ESTERS 1 G/1
LOVAZA(    ONE TAB DAILY ) ACTIVE -HX ENTRY CAPSULE, LIQUID FILLED ORAL
Status: ACTIVE | COMMUNITY
Start: 2011-05-25

## 2024-03-22 RX ORDER — LEVOTHYROXINE SODIUM 88 MCG
SYNTHROID( 88MCG ORAL  DAILY ) ACTIVE -HX ENTRY TABLET ORAL DAILY
Status: ACTIVE | COMMUNITY
Start: 2015-07-02

## 2024-03-22 RX ORDER — ALLOPURINOL 300 MG/1
ALLOPURINOL( 300MG ORAL 1 DAILY ) ACTIVE -HX ENTRY TABLET ORAL DAILY
Status: ACTIVE | COMMUNITY
Start: 2019-01-28

## 2024-03-22 RX ORDER — GINKGO BILOBA LEAF EXTRACT 120 MG
MILK THISTLE( 250MG ORAL  DAILY ) ACTIVE -HX ENTRY CAPSULE ORAL DAILY
Status: ACTIVE | COMMUNITY
Start: 2019-01-28

## 2024-03-22 RX ORDER — PREDNISONE 1 MG/1
PREDNISONE( 6MG ORAL 9 DAILY ) ACTIVE -HX ENTRY TABLET ORAL DAILY
Status: ACTIVE | COMMUNITY
Start: 2019-01-28

## 2024-03-22 RX ORDER — GLUCOSAMINE/MSM/CHONDROIT SULF 500-166.6
VITAMIN D3( 2000UNIT ORAL  DAILY ) ACTIVE -HX ENTRY TABLET ORAL DAILY
Status: ACTIVE | COMMUNITY
Start: 2019-01-28

## 2024-03-22 RX ORDER — ROSUVASTATIN CALCIUM 5 MG/1
1 TABLET TABLET, COATED ORAL ONCE A DAY
Status: ACTIVE | COMMUNITY

## 2024-03-22 RX ORDER — CYCLOSPORINE 0.5 MG/ML
RESTASIS( 0.05% OPHTHALMIC  DAILY ) ACTIVE -HX ENTRY EMULSION OPHTHALMIC DAILY
Status: ACTIVE | COMMUNITY
Start: 2019-01-28

## 2024-08-01 ENCOUNTER — APPOINTMENT (RX ONLY)
Dept: URBAN - METROPOLITAN AREA CLINIC 126 | Facility: CLINIC | Age: 84
Setting detail: DERMATOLOGY
End: 2024-08-01

## 2024-08-01 DIAGNOSIS — L82.1 OTHER SEBORRHEIC KERATOSIS: ICD-10-CM

## 2024-08-01 DIAGNOSIS — I83.9 ASYMPTOMATIC VARICOSE VEINS OF LOWER EXTREMITIES: ICD-10-CM

## 2024-08-01 DIAGNOSIS — L81.4 OTHER MELANIN HYPERPIGMENTATION: ICD-10-CM

## 2024-08-01 DIAGNOSIS — Z71.89 OTHER SPECIFIED COUNSELING: ICD-10-CM

## 2024-08-01 DIAGNOSIS — D18.0 HEMANGIOMA: ICD-10-CM

## 2024-08-01 PROBLEM — D18.01 HEMANGIOMA OF SKIN AND SUBCUTANEOUS TISSUE: Status: ACTIVE | Noted: 2024-08-01

## 2024-08-01 PROBLEM — I83.93 ASYMPTOMATIC VARICOSE VEINS OF BILATERAL LOWER EXTREMITIES: Status: ACTIVE | Noted: 2024-08-01

## 2024-08-01 PROCEDURE — ? SUNSCREEN RECOMMENDATIONS

## 2024-08-01 PROCEDURE — ? COUNSELING

## 2024-08-01 PROCEDURE — 99213 OFFICE O/P EST LOW 20 MIN: CPT

## 2024-08-01 ASSESSMENT — LOCATION DETAILED DESCRIPTION DERM
LOCATION DETAILED: LEFT MEDIAL UPPER BACK
LOCATION DETAILED: MIDDLE STERNUM
LOCATION DETAILED: RIGHT ANKLE
LOCATION DETAILED: LEFT PROXIMAL DORSAL FOREARM
LOCATION DETAILED: RIGHT PROXIMAL DORSAL FOREARM
LOCATION DETAILED: LEFT DISTAL PRETIBIAL REGION

## 2024-08-01 ASSESSMENT — LOCATION SIMPLE DESCRIPTION DERM
LOCATION SIMPLE: LEFT UPPER BACK
LOCATION SIMPLE: RIGHT ANKLE
LOCATION SIMPLE: LEFT FOREARM
LOCATION SIMPLE: RIGHT FOREARM
LOCATION SIMPLE: CHEST
LOCATION SIMPLE: LEFT PRETIBIAL REGION

## 2024-08-01 ASSESSMENT — LOCATION ZONE DERM
LOCATION ZONE: TRUNK
LOCATION ZONE: LEG
LOCATION ZONE: ARM

## 2025-01-24 ENCOUNTER — RX ONLY (RX ONLY)
Age: 85
End: 2025-01-24

## 2025-08-01 ENCOUNTER — APPOINTMENT (OUTPATIENT)
Dept: URBAN - METROPOLITAN AREA CLINIC 126 | Facility: CLINIC | Age: 85
Setting detail: DERMATOLOGY
End: 2025-08-01

## 2025-08-01 DIAGNOSIS — L81.4 OTHER MELANIN HYPERPIGMENTATION: ICD-10-CM

## 2025-08-01 DIAGNOSIS — Z85.828 PERSONAL HISTORY OF OTHER MALIGNANT NEOPLASM OF SKIN: ICD-10-CM

## 2025-08-01 DIAGNOSIS — D18.0 HEMANGIOMA: ICD-10-CM

## 2025-08-01 DIAGNOSIS — L82.1 OTHER SEBORRHEIC KERATOSIS: ICD-10-CM

## 2025-08-01 DIAGNOSIS — Z71.89 OTHER SPECIFIED COUNSELING: ICD-10-CM

## 2025-08-01 DIAGNOSIS — D69.2 OTHER NONTHROMBOCYTOPENIC PURPURA: ICD-10-CM

## 2025-08-01 DIAGNOSIS — D49.2 NEOPLASM OF UNSPECIFIED BEHAVIOR OF BONE, SOFT TISSUE, AND SKIN: ICD-10-CM

## 2025-08-01 PROBLEM — D18.01 HEMANGIOMA OF SKIN AND SUBCUTANEOUS TISSUE: Status: ACTIVE | Noted: 2025-08-01

## 2025-08-01 PROCEDURE — ? COUNSELING

## 2025-08-01 PROCEDURE — ?: Mod: 25

## 2025-08-01 PROCEDURE — ? SUNSCREEN RECOMMENDATIONS

## 2025-08-01 PROCEDURE — ? BIOPSY BY SHAVE METHOD

## 2025-08-01 PROCEDURE — ?

## 2025-08-01 ASSESSMENT — LOCATION DETAILED DESCRIPTION DERM
LOCATION DETAILED: RIGHT MEDIAL UPPER BACK
LOCATION DETAILED: LEFT MEDIAL BREAST 7-8:00 REGION
LOCATION DETAILED: LEFT INFRAMAMMARY CREASE (INNER QUADRANT)
LOCATION DETAILED: LEFT PROXIMAL DORSAL FOREARM
LOCATION DETAILED: RIGHT PROXIMAL DORSAL FOREARM
LOCATION DETAILED: INFERIOR THORACIC SPINE
LOCATION DETAILED: LEFT ANTERIOR SHOULDER
LOCATION DETAILED: RIGHT MID-UPPER BACK
LOCATION DETAILED: LOWER STERNUM

## 2025-08-01 ASSESSMENT — LOCATION ZONE DERM
LOCATION ZONE: ARM
LOCATION ZONE: TRUNK

## 2025-08-01 ASSESSMENT — LOCATION SIMPLE DESCRIPTION DERM
LOCATION SIMPLE: RIGHT FOREARM
LOCATION SIMPLE: UPPER BACK
LOCATION SIMPLE: LEFT SHOULDER
LOCATION SIMPLE: LEFT FOREARM
LOCATION SIMPLE: LEFT BREAST
LOCATION SIMPLE: CHEST
LOCATION SIMPLE: RIGHT UPPER BACK

## 2025-08-01 NOTE — PROCEDURE: MIPS QUALITY
Quality 47: Advance Care Plan: Advance Care Planning discussed and documented; advance care plan or surrogate decision maker documented in the medical record.
Detail Level: Simple
Quality 226: Preventive Care And Screening: Tobacco Use: Screening And Cessation Intervention: Patient screened for tobacco use and is an ex/non-smoker
Quality 508: Adult Covid-19 Vaccination Status: Patients who are not up to date on their COVID-19 vaccinations as defined by CDC recommendations on current vaccination

## 2025-08-01 NOTE — PROCEDURE: BIOPSY BY SHAVE METHOD
Body Location Override (Optional - Billing Will Still Be Based On Selected Body Map Location If Applicable): left superior shoulder
Detail Level: Detailed
Depth Of Biopsy: dermis
Was A Bandage Applied: Yes
Size Of Lesion In Cm: 0
Biopsy Type: H and E
Biopsy Method: Personna blade
Anesthesia Type: 1% lidocaine with 1:200,000 epinephrine and a 1:10 solution of 8.4% sodium bicarbonate
Anesthesia Volume In Cc: 0.5
Hemostasis: Aluminum Chloride
Wound Care: Petrolatum
Dressing: bandage
Destruction After The Procedure: No
Type Of Destruction Used: Curettage
Curettage Text: The wound bed was treated with curettage after the biopsy was performed.
Cryotherapy Text: The wound bed was treated with cryotherapy after the biopsy was performed.
Electrodesiccation Text: The wound bed was treated with electrodesiccation after the biopsy was performed.
Electrodesiccation And Curettage Text: The wound bed was treated with electrodesiccation and curettage after the biopsy was performed.
Silver Nitrate Text: The wound bed was treated with silver nitrate after the biopsy was performed.
Lab: [id number]
Lab Facility: [id number]
Medical Necessity Information: It is in your best interest to select a reason for this procedure from the list below. All of these items fulfill various CMS LCD requirements except the new and changing color options.
Consent: Written consent was obtained and risks were reviewed including but not limited to scarring, infection, bleeding, scabbing, incomplete removal, nerve damage and allergy to anesthesia.
Post-Care Instructions: I reviewed with the patient in detail post-care instructions. Patient is to keep the biopsy site dry overnight, and then apply bacitracin twice daily until healed. Patient may apply hydrogen peroxide soaks to remove any crusting.
Notification Instructions: Patient will be notified of biopsy results. However, patient instructed to call the office if not contacted within 2 weeks.
Billing Type: Third-Party Bill
Information: Selecting Yes will display possible errors in your note based on the variables you have selected. This validation is only offered as a suggestion for you. PLEASE NOTE THAT THE VALIDATION TEXT WILL BE REMOVED WHEN YOU FINALIZE YOUR NOTE. IF YOU WANT TO FAX A PRELIMINARY NOTE YOU WILL NEED TO TOGGLE THIS TO 'NO' IF YOU DO NOT WANT IT IN YOUR FAXED NOTE.